# Patient Record
Sex: FEMALE | Race: WHITE | NOT HISPANIC OR LATINO | URBAN - METROPOLITAN AREA
[De-identification: names, ages, dates, MRNs, and addresses within clinical notes are randomized per-mention and may not be internally consistent; named-entity substitution may affect disease eponyms.]

---

## 2023-11-14 ENCOUNTER — OFFICE VISIT (OUTPATIENT)
Dept: FAMILY MEDICINE CLINIC | Facility: CLINIC | Age: 33
End: 2023-11-14
Payer: COMMERCIAL

## 2023-11-14 VITALS
RESPIRATION RATE: 14 BRPM | HEART RATE: 68 BPM | BODY MASS INDEX: 24.43 KG/M2 | WEIGHT: 152 LBS | DIASTOLIC BLOOD PRESSURE: 68 MMHG | SYSTOLIC BLOOD PRESSURE: 112 MMHG | OXYGEN SATURATION: 98 % | TEMPERATURE: 97.5 F | HEIGHT: 66 IN

## 2023-11-14 DIAGNOSIS — F41.9 ANXIETY: ICD-10-CM

## 2023-11-14 DIAGNOSIS — N30.00 ACUTE CYSTITIS WITHOUT HEMATURIA: Primary | ICD-10-CM

## 2023-11-14 LAB
BILIRUB BLD-MCNC: NEGATIVE MG/DL
CLARITY, POC: ABNORMAL
COLOR UR: YELLOW
EXPIRATION DATE: ABNORMAL
GLUCOSE UR STRIP-MCNC: NEGATIVE MG/DL
KETONES UR QL: NEGATIVE
LEUKOCYTE EST, POC: ABNORMAL
Lab: ABNORMAL
NITRITE UR-MCNC: NEGATIVE MG/ML
PH UR: 6.5 [PH] (ref 5–8)
PROT UR STRIP-MCNC: NEGATIVE MG/DL
RBC # UR STRIP: ABNORMAL /UL
SP GR UR: 1 (ref 1–1.03)
UROBILINOGEN UR QL: ABNORMAL

## 2023-11-14 PROCEDURE — 99214 OFFICE O/P EST MOD 30 MIN: CPT | Performed by: NURSE PRACTITIONER

## 2023-11-14 RX ORDER — DIPHENOXYLATE HYDROCHLORIDE AND ATROPINE SULFATE 2.5; .025 MG/1; MG/1
TABLET ORAL
COMMUNITY

## 2023-11-14 RX ORDER — SERTRALINE HYDROCHLORIDE 25 MG/1
37.5 TABLET, FILM COATED ORAL DAILY
Qty: 135 TABLET | Refills: 0 | Status: SHIPPED | OUTPATIENT
Start: 2023-11-14

## 2023-11-14 RX ORDER — NITROFURANTOIN 25; 75 MG/1; MG/1
100 CAPSULE ORAL 2 TIMES DAILY
Qty: 6 CAPSULE | Refills: 0 | Status: SHIPPED | OUTPATIENT
Start: 2023-11-14 | End: 2023-11-17

## 2023-11-14 RX ORDER — EPINEPHRINE 0.3 MG/.3ML
INJECTION SUBCUTANEOUS
COMMUNITY
Start: 2023-09-11

## 2023-11-14 NOTE — PATIENT INSTRUCTIONS
Discharge instructions start nitrofurantoin as soon as possible, push fluids  AZO for comfort if needed  Warm bath as needed as well for comfort severe pain fever chills nausea vomiting emergency room  Your symptoms should resolve over the next 2 to 3 days if not I need to change your antibiotic or rethink the diagnosis.    Slow titration from sertraline recommended due to length of treatment and your previous response  25 mg 1-1/2 tablet daily for 6 weeks  Then 25 mg daily for 6 weeks  Then 12.5 mg daily indefinitely  Or for 6 to 12 weeks then DC  Okay to lengthen any period as need be

## 2023-11-14 NOTE — PROGRESS NOTES
"Chief Complaint  Urinary Frequency (Pt states burning sensation when urinating and cloudy urination) and Nausea    Subjective        Fabiola Chen presents to [facility]  History of Present Illness  Patient complains of burning frequency urgency since yesterday   No fever chills no flank pain no severe symptoms, think she has a UTI, she looked up her symptoms are consistent, she has had this quite a few years ago generally she is healthy,  , 2 kids at home,  Some chronic stressors and she has some postpartum depression several years ago, as well as  She went through divorce 3 to 4 years ago  Marriage is good now home life is good,  She would like to discontinue slowly Zoloft she is tried this in the past but she has had some rebound anxiety and went back on but presently she is down to 50 mg for some time and then last week she went down to 25 mg and doing okay she is asking for guidance on a slow titration from the Zoloft.    No chronic mental illness, no previous hospitalizations nothing to suggest any previous psychosis or mell or other alternative diagnosis.    No drug or alcohol abuse    \  Urinary Frequency   Associated symptoms include frequency and nausea.   Nausea  Associated symptoms include nausea.       Objective   Vital Signs:  /68   Pulse 68   Temp 97.5 °F (36.4 °C) (Temporal)   Resp 14   Ht 167.6 cm (66\")   Wt 68.9 kg (152 lb)   SpO2 98%   BMI 24.53 kg/m²   Estimated body mass index is 24.53 kg/m² as calculated from the following:    Height as of this encounter: 167.6 cm (66\").    Weight as of this encounter: 68.9 kg (152 lb).    BMI is within normal parameters. No other follow-up for BMI required.      Physical Exam  Vitals reviewed.   Constitutional:       General: She is not in acute distress.     Appearance: Normal appearance. She is well-developed. She is not ill-appearing, toxic-appearing or diaphoretic.      Comments: Pleasant appears well "   HENT:      Head: Normocephalic.      Nose: Nose normal.   Eyes:      General: No scleral icterus.     Conjunctiva/sclera: Conjunctivae normal.      Pupils: Pupils are equal, round, and reactive to light.   Neck:      Thyroid: No thyromegaly.      Vascular: No JVD.   Cardiovascular:      Rate and Rhythm: Normal rate and regular rhythm.      Heart sounds: Normal heart sounds. No murmur heard.     No friction rub. No gallop.   Pulmonary:      Effort: Pulmonary effort is normal. No respiratory distress.      Breath sounds: Normal breath sounds. No stridor. No wheezing or rales.   Abdominal:      General: Abdomen is flat. Bowel sounds are normal. There is no distension.      Palpations: Abdomen is soft. There is no mass.      Tenderness: There is no abdominal tenderness. There is no right CVA tenderness, left CVA tenderness, guarding or rebound.      Hernia: No hernia is present.      Comments: No hepatosplenomegaly, no ascites,  Normal exam nontender   Musculoskeletal:         General: No tenderness.      Cervical back: Neck supple.   Lymphadenopathy:      Cervical: No cervical adenopathy.   Skin:     General: Skin is warm and dry.      Findings: No erythema or rash.   Neurological:      General: No focal deficit present.      Mental Status: She is alert and oriented to person, place, and time. Mental status is at baseline.      Deep Tendon Reflexes: Reflexes are normal and symmetric.   Psychiatric:         Mood and Affect: Mood normal.         Behavior: Behavior normal.         Thought Content: Thought content normal.         Judgment: Judgment normal.        Result Review :                Assessment and Plan   Diagnoses and all orders for this visit:    1. Acute cystitis without hematuria (Primary)    2. Anxiety    Other orders  -     sertraline (Zoloft) 25 MG tablet; Take 1.5 tablets by mouth Daily.  Dispense: 135 tablet; Refill: 0  -     nitrofurantoin, macrocrystal-monohydrate, (Macrobid) 100 MG capsule; Take 1  capsule by mouth 2 (Two) Times a Day for 3 days.  Dispense: 6 capsule; Refill: 0             Follow Up   No follow-ups on file.  Patient was given instructions and counseling regarding her condition or for health maintenance advice. Please see specific information pulled into the AVS if appropriate.     Patient will follow-up with Dr. Craft  She can update me along the way her progress and she will follow-up with Dr. Craft any difficulties    Patient Instructions   Discharge instructions start nitrofurantoin as soon as possible, push fluids  AZO for comfort if needed  Warm bath as needed as well for comfort severe pain fever chills nausea vomiting emergency room  Your symptoms should resolve over the next 2 to 3 days if not I need to change your antibiotic or rethink the diagnosis.    Slow titration from sertraline recommended due to length of treatment and your previous response  25 mg 1-1/2 tablet daily for 6 weeks  Then 25 mg daily for 6 weeks  Then 12.5 mg daily indefinitely  Or for 6 to 12 weeks then DC  Okay to lengthen any period as need be

## 2024-04-19 RX ORDER — SERTRALINE HYDROCHLORIDE 25 MG/1
50 TABLET, FILM COATED ORAL DAILY
Qty: 180 TABLET | Refills: 1 | Status: CANCELLED | OUTPATIENT
Start: 2024-04-19

## 2024-04-19 NOTE — TELEPHONE ENCOUNTER
----- Message from Fabiola Chen sent at 4/19/2024  8:28 AM EDT -----  Regarding: Request to Restart Sertraline 50 mg  Contact: [phone]  Hello, I weaned off sertraline and my last dose was a few months ago. Since then, I have had a lot of anxiety, my pms is much worse, and I have felt some depression return (nothing serious). I just feel like nothing can make me happy anymore and it wasn’t like that on sertraline. Apparently the medication was doing a lot to help me. After many failed attempts of going off of it, I think I may just need to stay on it to be my happiest self. May I please have a refill of 50 mg?

## 2024-04-19 NOTE — TELEPHONE ENCOUNTER
Rx Refill Note  Requested Prescriptions     Pending Prescriptions Disp Refills    sertraline (Zoloft) 25 MG tablet 180 tablet 1     Sig: Take 2 tablets by mouth Daily.      Last office visit with prescribing clinician: 2/20/2023   Last telemedicine visit with prescribing clinician: Visit date not found   Next office visit with prescribing clinician: Visit date not found                         Would you like a call back once the refill request has been completed: [] Yes [] No    If the office needs to give you a call back, can they leave a voicemail: [] Yes [] No    Nicole Alfredo  04/19/24, 08:43 EDT

## 2024-04-22 ENCOUNTER — OFFICE VISIT (OUTPATIENT)
Dept: FAMILY MEDICINE CLINIC | Facility: CLINIC | Age: 34
End: 2024-04-22
Payer: COMMERCIAL

## 2024-04-22 VITALS
TEMPERATURE: 98.4 F | WEIGHT: 143 LBS | SYSTOLIC BLOOD PRESSURE: 122 MMHG | HEIGHT: 66 IN | OXYGEN SATURATION: 98 % | RESPIRATION RATE: 18 BRPM | BODY MASS INDEX: 22.98 KG/M2 | DIASTOLIC BLOOD PRESSURE: 78 MMHG | HEART RATE: 74 BPM

## 2024-04-22 DIAGNOSIS — F41.9 ANXIETY: Primary | ICD-10-CM

## 2024-04-22 DIAGNOSIS — Z00.00 ANNUAL PHYSICAL EXAM: Primary | ICD-10-CM

## 2024-04-22 PROCEDURE — 99213 OFFICE O/P EST LOW 20 MIN: CPT | Performed by: FAMILY MEDICINE

## 2024-04-22 NOTE — PROGRESS NOTES
"Chief Complaint  Chief Complaint   Patient presents with    Med Refill     Pt here for med refills        Subjective    History of Present Illness        Fabiola Chen presents to [facility] for   Anxiety  Presents for follow-up visit.  Symptoms include depressed mood, excessive worry, insomnia and malaise/fatigue.  Patient reports no chest pain, confusion, dizziness, dry mouth, irritability, nausea, palpitations or shortness of breath. Symptoms occur constantly. The severity of symptoms is moderate. The symptoms are aggravated by family issues, social activities and work stress. The patient sleeps 8 hours per night. The quality of sleep is poor.        Objective   Vital Signs:   Visit Vitals  /78   Pulse 74   Temp 98.4 °F (36.9 °C)   Resp 18   Ht 167.6 cm (66\")   Wt 64.9 kg (143 lb)   SpO2 98%   BMI 23.08 kg/m²          BMI is within normal parameters. No other follow-up for BMI required.     Physical Exam  Vitals reviewed.   Constitutional:       Appearance: She is well-developed.   HENT:      Head: Normocephalic.      Right Ear: External ear normal.      Left Ear: External ear normal.      Nose: Nose normal.   Eyes:      Conjunctiva/sclera: Conjunctivae normal.   Cardiovascular:      Rate and Rhythm: Normal rate and regular rhythm.   Pulmonary:      Effort: Pulmonary effort is normal.      Breath sounds: Normal breath sounds.   Musculoskeletal:         General: Normal range of motion.      Cervical back: Normal range of motion and neck supple.   Skin:     General: Skin is warm and dry.      Capillary Refill: Capillary refill takes less than 2 seconds.   Neurological:      Mental Status: She is alert and oriented to person, place, and time.            Result Review :                    Assessment and Plan      Diagnoses and all orders for this visit:    1. Anxiety (Primary)  Assessment & Plan:  Worsening.  Patient was restarted on Zoloft to help treat her symptoms.  Patient " was encouraged to return to clinic in 1 month for follow-up.    Orders:  -     sertraline (Zoloft) 50 MG tablet; Take 1 tablet by mouth Daily.  Dispense: 90 tablet; Refill: 1             Follow Up   No follow-ups on file.  Patient was given instructions and counseling regarding her condition or for health maintenance advice. Please see specific information pulled into the AVS if appropriate.       Answers submitted by the patient for this visit:  Primary Reason for Visit (Submitted on 4/22/2024)  What is the primary reason for your visit?: Anxiety

## 2024-04-29 NOTE — ASSESSMENT & PLAN NOTE
Worsening.  Patient was restarted on Zoloft to help treat her symptoms.  Patient was encouraged to return to clinic in 1 month for follow-up.

## 2024-06-05 ENCOUNTER — OFFICE VISIT (OUTPATIENT)
Dept: FAMILY MEDICINE CLINIC | Facility: CLINIC | Age: 34
End: 2024-06-05
Payer: COMMERCIAL

## 2024-06-05 VITALS
TEMPERATURE: 97.3 F | SYSTOLIC BLOOD PRESSURE: 110 MMHG | DIASTOLIC BLOOD PRESSURE: 80 MMHG | OXYGEN SATURATION: 99 % | HEART RATE: 62 BPM | BODY MASS INDEX: 22.98 KG/M2 | RESPIRATION RATE: 18 BRPM | HEIGHT: 66 IN | WEIGHT: 143 LBS

## 2024-06-05 DIAGNOSIS — B00.1 COLD SORE: ICD-10-CM

## 2024-06-05 DIAGNOSIS — Z00.00 ANNUAL PHYSICAL EXAM: Primary | ICD-10-CM

## 2024-06-05 PROCEDURE — 99395 PREV VISIT EST AGE 18-39: CPT | Performed by: FAMILY MEDICINE

## 2024-06-05 RX ORDER — VALACYCLOVIR HYDROCHLORIDE 500 MG/1
500 TABLET, FILM COATED ORAL EVERY 12 HOURS
Qty: 30 TABLET | Refills: 12 | Status: SHIPPED | OUTPATIENT
Start: 2024-06-05

## 2024-06-05 NOTE — PROGRESS NOTES
"Chief Complaint  Chief Complaint   Patient presents with    Annual Exam     Physical        Subjective    History of Present Illness        Fabiola Chen presents to [facility] for   History of Present Illness  Fabiola Chen is a 33 y.o. female here for her annual physical with me. Fabiola is here for coordination of medical care, to discuss health maintenance, disease prevention as well as to followup on medical problems. Patient has been followed by me since 2022. Patient's last CPE was 2023. Activity level is heavy. Exercises 7 per week. Appetite is good. Feels well with few complaints. Energy level is fair. Sleeps fairly well.      History of Present Illness      Objective   Vital Signs:   Visit Vitals  /80   Pulse 62   Temp 97.3 °F (36.3 °C)   Resp 18   Ht 167.6 cm (66\")   Wt 64.9 kg (143 lb)   SpO2 99%   BMI 23.08 kg/m²          BMI is within normal parameters. No other follow-up for BMI required.     Physical Exam  Vitals reviewed.   Constitutional:       Appearance: She is well-developed.   HENT:      Head: Normocephalic and atraumatic.      Nose: Nose normal.   Eyes:      General: Lids are normal.      Conjunctiva/sclera: Conjunctivae normal.      Pupils: Pupils are equal, round, and reactive to light.   Cardiovascular:      Rate and Rhythm: Normal rate and regular rhythm.      Pulses: Normal pulses.      Heart sounds: Normal heart sounds.   Pulmonary:      Effort: Pulmonary effort is normal. No respiratory distress.      Breath sounds: Normal breath sounds.   Abdominal:      General: Bowel sounds are normal.      Palpations: Abdomen is soft.   Musculoskeletal:         General: Normal range of motion.      Cervical back: Normal range of motion and neck supple.   Skin:     General: Skin is warm and dry.      Capillary Refill: Capillary refill takes less than 2 seconds.   Neurological:      Mental Status: She is alert and oriented to person, place, and time. "   Psychiatric:         Behavior: Behavior normal.         Thought Content: Thought content normal.         Judgment: Judgment normal.        Physical Exam           Result Review :  Results                            Assessment and Plan      Diagnoses and all orders for this visit:    1. Annual physical exam (Primary)  Assessment & Plan:  Discussed injury prevention, diet and exercise, safe sexual practices, and screening for common diseases. Encouraged use of sunscreen and seatbelts. Discussed timing of  cervical cancer screening. Encouraged monthly self-breast exams, yearly clinical breast exams, and discussed timing of mammograms. Avoidance of tobacco encouraged. Limitation or avoidance of alcohol encouraged. Recommend yearly dental and eye exams. Also discussed monitoring of blood pressure, lipids.      2. Cold sore  -     valACYclovir (VALTREX) 500 MG tablet; Take 1 tablet by mouth Every 12 (Twelve) Hours.  Dispense: 30 tablet; Refill: 12       Assessment & Plan             Follow Up   No follow-ups on file.  Patient was given instructions and counseling regarding her condition or for health maintenance advice. Please see specific information pulled into the AVS if appropriate.       Answers submitted by the patient for this visit:  Other (Submitted on 6/3/2024)  Please describe your symptoms.: Yearly checkup. Also interested in medicine to help occasional cold sores.  Have you had these symptoms before?: Yes  How long have you been having these symptoms?: 5-7 days  Primary Reason for Visit (Submitted on 6/3/2024)  What is the primary reason for your visit?: Other

## 2024-08-19 DIAGNOSIS — F41.9 ANXIETY: ICD-10-CM

## 2024-08-19 NOTE — TELEPHONE ENCOUNTER
Rx Refill Note  Requested Prescriptions     Pending Prescriptions Disp Refills    sertraline (Zoloft) 50 MG tablet 90 tablet 1     Sig: Take 1 tablet by mouth Daily.      Last office visit with prescribing clinician: 6/5/2024   Last telemedicine visit with prescribing clinician: Visit date not found   Next office visit with prescribing clinician: Visit date not found                         Would you like a call back once the refill request has been completed: [] Yes [] No    If the office needs to give you a call back, can they leave a voicemail: [] Yes [] No    Gabriela Aleman MA  08/19/24, 16:23 EDT

## 2025-03-15 DIAGNOSIS — F41.9 ANXIETY: ICD-10-CM

## 2025-03-31 ENCOUNTER — OFFICE VISIT (OUTPATIENT)
Dept: FAMILY MEDICINE CLINIC | Facility: CLINIC | Age: 35
End: 2025-03-31
Payer: COMMERCIAL

## 2025-03-31 VITALS
HEIGHT: 66 IN | BODY MASS INDEX: 23.5 KG/M2 | WEIGHT: 146.2 LBS | OXYGEN SATURATION: 99 % | HEART RATE: 74 BPM | SYSTOLIC BLOOD PRESSURE: 136 MMHG | DIASTOLIC BLOOD PRESSURE: 84 MMHG

## 2025-03-31 DIAGNOSIS — F41.9 ANXIETY: ICD-10-CM

## 2025-03-31 DIAGNOSIS — F41.8 SITUATIONAL ANXIETY: Primary | ICD-10-CM

## 2025-03-31 RX ORDER — SERTRALINE HYDROCHLORIDE 100 MG/1
100 TABLET, FILM COATED ORAL DAILY
Qty: 90 TABLET | Refills: 1 | Status: SHIPPED | OUTPATIENT
Start: 2025-03-31

## 2025-03-31 RX ORDER — TRAZODONE HYDROCHLORIDE 50 MG/1
25-50 TABLET ORAL NIGHTLY
Qty: 30 TABLET | Refills: 2 | Status: SHIPPED | OUTPATIENT
Start: 2025-03-31

## 2025-03-31 NOTE — PROGRESS NOTES
"Chief Complaint  Anxiety ( had stroke. Pt has been under lots of stress and anxiety has gotten worse. )    Subjective        Fabiola Chen presents to [facility]  History of Present Illness  Pleasant patient here today follow-up with a recent severe medical illness of her  he had a recent stroke due to a septal defect, he is home recovering but still has substantial cognitive and speech and visual disabilities,  Patient, is there for  presently, but having some increased anxiety he does not know she is here, she had increased difficulty sleeping last night,  Transporting, 2 kids to school daily, she has increased anxiety, feelings of feeling overwhelmed excessive worry irritability,  No drug alcohol tobacco abuse does have about 3 drinks on the weekend responsibly,   Previously taking sertraline Zoloft due to, situational anxiety, she goes to periods of being off the medication does well  More recently been taking it about a year        ,  Anxiety  Presents for initial visit. Onset was 1 to 6 months ago. The problem is comes and goes since onset. Symptoms include depressed mood, excessive worry, insomnia, irritability, obsessions and malaise/fatigue.  Patient reports no chest pain, confusion, dizziness, dry mouth, palpitations or shortness of breath. Symptoms occur constantly. The severity of symptoms is moderate. The symptoms are aggravated by nothing, family issues and social activities. Nothing, family issues and social activities aggravates the symptoms. The patient sleeps 7 hours per night. The quality of sleep is poor.   Additional comments:  had a stroke 2 weeks ago and I am really struggling      Objective   Vital Signs:  /84 (BP Location: Right arm, Patient Position: Sitting, Cuff Size: Adult)   Pulse 74   Ht 167.6 cm (66\")   Wt 66.3 kg (146 lb 3.2 oz)   SpO2 99%   BMI 23.60 kg/m²   Estimated body mass index is 23.6 kg/m² as " "calculated from the following:    Height as of this encounter: 167.6 cm (66\").    Weight as of this encounter: 66.3 kg (146 lb 3.2 oz).    BMI is within normal parameters. No other follow-up for BMI required.      Physical Exam  Constitutional:       General: She is not in acute distress.     Appearance: Normal appearance. She is not ill-appearing, toxic-appearing or diaphoretic.   Eyes:      Conjunctiva/sclera: Conjunctivae normal.      Pupils: Pupils are equal, round, and reactive to light.   Pulmonary:      Effort: Pulmonary effort is normal. No respiratory distress.   Skin:     General: Skin is warm and dry.   Neurological:      General: No focal deficit present.      Mental Status: Mental status is at baseline.   Psychiatric:      Comments: Appropriate for situation good eye contact, nearly tearful at times, but maintaining composure   Answers questions appropriately dressed appropriately, mannerism appropriate cognitive intact no pressured speech, pleasant affect        Result Review :                Assessment and Plan   Diagnoses and all orders for this visit:    1. Situational anxiety (Primary)    2. Anxiety  -     sertraline (ZOLOFT) 100 MG tablet; Take 1 tablet by mouth Daily.  Dispense: 90 tablet; Refill: 1    Other orders  -     traZODone (DESYREL) 50 MG tablet; Take 0.5-1 tablets by mouth Every Night. As needed insomnia  Dispense: 30 tablet; Refill: 2             Follow Up   Return in about 3 weeks (around 4/21/2025) for Print discharge instructions/educational handouts.  Patient was given instructions and counseling regarding her condition or for health maintenance advice. Please see specific information pulled into the AVS if appropriate.   Risk-benefit medication proper storage of trazodone keep out of reach of children,  Caution could cause next-day sedation try lower dose first  Plan increasing sertraline  Follow-up with PCP    Encouraged patient  Discussed common early changes after CVA " with aphasia she will work closely with patient's doctors,  How these changes may affect the relationship early on at later  Healthy strategy for both    Patient Instructions   Discharge instruction    Increase sertraline  Starting tomorrow 75 mg daily for 3 days  Then increase to 100 mg as soon as you are tolerating this well without increased sedation difficulty  If needed trazodone 1/2 to 1 tablet 30 minutes prior to bedtime for insomnia lowest effective dose shortest time possible caution could cause next-day sedation drowsiness discontinue if any difficulties    Keep this out of reach of children,    Follow-up in 3 weeks,  Update me 1 week progress,    Consider counseling  If he can go for a walk during the pretty days, get outside for a few minutes take at least 5 or 10 minutes for yourself,    Severe depression agitation emergency room,    Let me know or return office should you need FMLA

## 2025-03-31 NOTE — PATIENT INSTRUCTIONS
Discharge instruction    Increase sertraline  Starting tomorrow 75 mg daily for 3 days  Then increase to 100 mg as soon as you are tolerating this well without increased sedation difficulty  If needed trazodone 1/2 to 1 tablet 30 minutes prior to bedtime for insomnia lowest effective dose shortest time possible caution could cause next-day sedation drowsiness discontinue if any difficulties    Keep this out of reach of children,    Follow-up in 3 weeks,  Update me 1 week progress,    Consider counseling  If he can go for a walk during the pretty days, get outside for a few minutes take at least 5 or 10 minutes for yourself,    Severe depression agitation emergency room,    Let me know or return office should you need FMLA

## 2025-04-21 ENCOUNTER — TELEMEDICINE (OUTPATIENT)
Dept: FAMILY MEDICINE CLINIC | Facility: CLINIC | Age: 35
End: 2025-04-21
Payer: COMMERCIAL

## 2025-04-21 DIAGNOSIS — F41.8 MIXED ANXIETY DEPRESSIVE DISORDER: Primary | ICD-10-CM

## 2025-04-21 PROCEDURE — 99213 OFFICE O/P EST LOW 20 MIN: CPT | Performed by: FAMILY MEDICINE

## 2025-04-21 RX ORDER — BUPROPION HYDROCHLORIDE 150 MG/1
150 TABLET ORAL DAILY
Qty: 90 TABLET | Refills: 2 | Status: SHIPPED | OUTPATIENT
Start: 2025-04-21

## 2025-04-21 NOTE — ASSESSMENT & PLAN NOTE
- Increased sertraline dosage due to stress from 's stroke.  - Reported weight gain and diminished emotional and sexual response while on sertraline.  - Plan to transition from sertraline to Wellbutrin: reduce sertraline dosage by taking half a tablet every other day for a week, then introduce Wellbutrin on alternate days     Follow-up  Follow-up in 1 month to assess the effectiveness of Wellbutrin.

## 2025-04-21 NOTE — PROGRESS NOTES
Chief Complaint  Chief Complaint   Patient presents with    Anxiety     Pt here for 3 week f/u per NP          Video/Telephone Visit    Subjective    History of Present Illness        Fabiola Chen presents to [facility] for   History of Present Illness  The patient presents via virtual visit for evaluation of anxiety.    Significant stress has been experienced due to her 's early-onset stroke and patent foramen ovale (PFO) diagnosis. This stress led to an increase in sertraline dosage, as recommended by Dr. Epley. However, weight gain of approximately 10 to 12 pounds has been reported since starting the medication. Additionally, a decrease in emotional responsiveness and a diminished quality of sexual experience are noted. Despite these side effects, the beneficial impact of sertraline on overall well-being is acknowledged. Interest is expressed in transitioning to Wellbutrin, citing previous unsuccessful attempts to discontinue Zoloft due to adverse effects. Persistent fatigue is also mentioned, even with adequate sleep duration of 8.5 to 9 hours, and questions are raised whether this could be a side effect of the current medication.    FAMILY HISTORY  Her  had a stroke at an early age and had a PFO in his heart.     History of Present Illness        Objective   Vital Signs:   There were no vitals taken for this visit.    Physical Exam  Constitutional:       Appearance: Normal appearance.   Neurological:      General: No focal deficit present.      Mental Status: She is alert and oriented to person, place, and time. Mental status is at baseline.        Physical Exam        Result Review :           Results             Assessment and Plan      Diagnoses and all orders for this visit:    1. Mixed anxiety depressive disorder (Primary)  Assessment & Plan:  - Increased sertraline dosage due to stress from 's stroke.  - Reported weight gain and diminished emotional  and sexual response while on sertraline.  - Plan to transition from sertraline to Wellbutrin: reduce sertraline dosage by taking half a tablet every other day for a week, then introduce Wellbutrin on alternate days     Follow-up  Follow-up in 1 month to assess the effectiveness of Wellbutrin.    Orders:  -     buPROPion XL (WELLBUTRIN XL) 150 MG 24 hr tablet; Take 1 tablet by mouth Daily.  Dispense: 90 tablet; Refill: 2       Assessment & Plan          The use of a video visit has been reviewed with the patient and verbal informed consent has been obtained.  Location of patient: home  Location of provider: [facility] [place] clinic  The patient has signed the video visit consent form.  The visit included audio and video interaction. No technical issues occurred during this visit.     Follow Up   No follow-ups on file.  Patient was given instructions and counseling regarding her condition or for health maintenance advice. Please see specific information pulled into the AVS if appropriate.     Patient or patient representative verbalized consent for the use of Ambient Listening during the visit with  Brandon Craft Sr, MD for chart documentation. 4/21/2025  10:19 EDT

## 2025-06-09 DIAGNOSIS — B00.1 COLD SORE: ICD-10-CM

## 2025-06-09 RX ORDER — VALACYCLOVIR HYDROCHLORIDE 500 MG/1
TABLET, FILM COATED ORAL
Qty: 30 TABLET | Refills: 12 | Status: SHIPPED | OUTPATIENT
Start: 2025-06-09